# Patient Record
Sex: FEMALE | Race: WHITE | NOT HISPANIC OR LATINO | Employment: FULL TIME | URBAN - METROPOLITAN AREA
[De-identification: names, ages, dates, MRNs, and addresses within clinical notes are randomized per-mention and may not be internally consistent; named-entity substitution may affect disease eponyms.]

---

## 2023-06-05 ENCOUNTER — HOSPITAL ENCOUNTER (EMERGENCY)
Facility: HOSPITAL | Age: 53
Discharge: HOME | End: 2023-06-05
Attending: EMERGENCY MEDICINE | Admitting: EMERGENCY MEDICINE
Payer: OTHER MISCELLANEOUS

## 2023-06-05 ENCOUNTER — APPOINTMENT (OUTPATIENT)
Dept: GENERAL RADIOLOGY | Facility: HOSPITAL | Age: 53
End: 2023-06-05
Payer: OTHER MISCELLANEOUS

## 2023-06-05 VITALS
HEIGHT: 62 IN | TEMPERATURE: 98.6 F | DIASTOLIC BLOOD PRESSURE: 70 MMHG | WEIGHT: 167 LBS | OXYGEN SATURATION: 98 % | BODY MASS INDEX: 30.73 KG/M2 | HEART RATE: 80 BPM | SYSTOLIC BLOOD PRESSURE: 138 MMHG | RESPIRATION RATE: 16 BRPM

## 2023-06-05 DIAGNOSIS — M51.36 LUMBAR DEGENERATIVE DISC DISEASE: Primary | ICD-10-CM

## 2023-06-05 DIAGNOSIS — M51.34 THORACIC DEGENERATIVE DISC DISEASE: ICD-10-CM

## 2023-06-05 DIAGNOSIS — S39.012A BACK STRAIN, INITIAL ENCOUNTER: ICD-10-CM

## 2023-06-05 PROCEDURE — 99283 EMERGENCY DEPT VISIT LOW MDM: CPT

## 2023-06-05 PROCEDURE — 72100 X-RAY EXAM L-S SPINE 2/3 VWS: CPT

## 2023-06-05 PROCEDURE — 72072 X-RAY EXAM THORAC SPINE 3VWS: CPT

## 2023-06-05 RX ORDER — CYCLOBENZAPRINE HCL 10 MG
10 TABLET ORAL 3 TIMES DAILY PRN
Qty: 15 TABLET | Refills: 0 | Status: SHIPPED | OUTPATIENT
Start: 2023-06-05

## 2023-06-05 RX ORDER — ACETAMINOPHEN 500 MG
1000 TABLET ORAL ONCE
Status: COMPLETED | OUTPATIENT
Start: 2023-06-05 | End: 2023-06-05

## 2023-06-05 RX ORDER — LIDOCAINE 50 MG/G
1 PATCH TOPICAL EVERY 24 HOURS
Qty: 6 PATCH | Refills: 0 | Status: SHIPPED | OUTPATIENT
Start: 2023-06-05 | End: 2023-06-11

## 2023-06-05 RX ORDER — CYCLOBENZAPRINE HCL 10 MG
10 TABLET ORAL ONCE
Status: COMPLETED | OUTPATIENT
Start: 2023-06-05 | End: 2023-06-05

## 2023-06-05 RX ADMIN — CYCLOBENZAPRINE 10 MG: 10 TABLET, FILM COATED ORAL at 16:49

## 2023-06-05 RX ADMIN — ACETAMINOPHEN 1000 MG: 500 TABLET ORAL at 16:49

## 2023-06-05 NOTE — ED PROVIDER NOTES
Subjective   History of Present Illness  Pt is a 51 yo female presenting to ED with complaints of back pain. PMHx significant for HTN, HLD, Fatty liver and Depression. Pt was at work PTA and throwing a heavy bag into trash at work and had sudden onset of mid to lower back pain. She denies falling to the the ground. Pain worse with movement. She denies pain radiating into legs. She denies numbness or weakness to LE. She denies loss of bowel or bladder. She denies hx of significant back problems. She took no meds PTA. She is unable to take NSAIDs due to her liver. She denies other injuries. She uses tobacco and ETOH but denies drug use.     History provided by:  Patient and medical records    Review of Systems   Constitutional:  Negative for fever.   Eyes:  Negative for visual disturbance.   Respiratory:  Negative for cough and shortness of breath.    Cardiovascular:  Negative for chest pain.   Gastrointestinal:  Negative for abdominal pain, diarrhea, nausea and vomiting.   Genitourinary:  Negative for difficulty urinating and dysuria.   Musculoskeletal:  Positive for back pain. Negative for arthralgias and neck pain.   Skin:  Negative for color change and wound.   Neurological:  Negative for dizziness, weakness and numbness.     History reviewed. No pertinent past medical history.    No Known Allergies    No past surgical history on file.    History reviewed. No pertinent family history.    Social History     Socioeconomic History    Marital status: Other           Objective   Physical Exam  Vitals and nursing note reviewed.   Constitutional:       Appearance: She is well-developed.   HENT:      Head: Atraumatic.      Nose: Nose normal.   Eyes:      General: Lids are normal.      Conjunctiva/sclera: Conjunctivae normal.      Pupils: Pupils are equal, round, and reactive to light.   Cardiovascular:      Rate and Rhythm: Normal rate and regular rhythm.      Heart sounds: Normal heart sounds.   Pulmonary:      Effort:  Pulmonary effort is normal.      Breath sounds: Normal breath sounds. No wheezing.   Abdominal:      General: There is no distension.      Palpations: Abdomen is soft.      Tenderness: There is no abdominal tenderness. There is no guarding or rebound.   Musculoskeletal:         General: No tenderness. Normal range of motion.      Cervical back: Normal range of motion and neck supple. No tenderness. Normal range of motion.      Thoracic back: Tenderness present. No swelling, deformity or spasms. Normal range of motion.      Lumbar back: Tenderness present. No swelling, deformity or spasms. Normal range of motion. Negative right straight leg raise test and negative left straight leg raise test.   Skin:     General: Skin is warm and dry.      Findings: No erythema or rash.   Neurological:      Mental Status: She is alert and oriented to person, place, and time.      Sensory: No sensory deficit.   Psychiatric:         Speech: Speech normal.         Behavior: Behavior normal.       Procedures           ED Course      No results found for this or any previous visit (from the past 24 hour(s)).  Note: In addition to lab results from this visit, the labs listed above may include labs taken at another facility or during a different encounter within the last 24 hours. Please correlate lab times with ED admission and discharge times for further clarification of the services performed during this visit.    XR Spine Thoracic 3 View   Final Result   Impression:   Multiple views of the thoracic and lumbar spine were obtained. No acute fracture is identified. Bones are demineralized. There are degenerative changes within the thoracic and lumbar spine. There is 7 mm anterolisthesis at L4-L5. Vertebral body heights    appear maintained. Sacroiliac joints appear unremarkable. Visualized lungs are clear.      If there is clinical suspicion for occult fracture, further evaluation with CT may be considered.      Electronically Signed:  "Olegario Epperson     6/5/2023 5:09 PM EDT     Workstation ID: [id number]      XR Spine Lumbar 2 or 3 View   Final Result   Impression:   Multiple views of the thoracic and lumbar spine were obtained. No acute fracture is identified. Bones are demineralized. There are degenerative changes within the thoracic and lumbar spine. There is 7 mm anterolisthesis at L4-L5. Vertebral body heights    appear maintained. Sacroiliac joints appear unremarkable. Visualized lungs are clear.      If there is clinical suspicion for occult fracture, further evaluation with CT may be considered.      Electronically Signed: Olegario Epperson     6/5/2023 5:09 PM EDT     Workstation ID: [id number]        Vitals:    06/05/23 1515 06/05/23 1732   BP: 147/77 138/70   BP Location: Left arm Left arm   Patient Position: Sitting Sitting   Pulse: 94 80   Resp: 18 16   Temp: 98.7 °F (37.1 °C) 98.6 °F (37 °C)   TempSrc: Oral Oral   SpO2: 95% 98%   Weight: 75.8 kg (167 lb)    Height: 157.5 cm (62\")      Medications   cyclobenzaprine (FLEXERIL) tablet 10 mg (10 mg Oral Given 6/5/23 1649)   acetaminophen (TYLENOL) tablet 1,000 mg (1,000 mg Oral Given 6/5/23 1649)     ECG/EMG Results (last 24 hours)       ** No results found for the last 24 hours. **          No orders to display       DISCHARGE    Patient discharged in stable condition.    Reviewed implications of results, diagnosis, meds, responsibility to follow up, warning signs and symptoms of possible worsening, potential complications and reasons to return to ER.    Patient/Family voiced understanding of above instructions.    Discussed plan for discharge, as there is no emergent indication for admission.  Pt/family is agreeable and understands need for follow up and possible repeat testing.  Pt/family is aware that discharge does not mean that nothing is wrong but that it indicates no emergency is currently present that requires admission and they must continue care with follow-up as given below or with " a physician of their choice.     FOLLOW-UP  Lizzy Esparza, APRN  [address] [facility] [id number] [phone]    Schedule an appointment as soon as possible for a visit       [facility] OCCUPATIONAL MEDICINE  [address]  [facility] [address] [phone]        [facility] Emergency Department  [address]  [facility] [address] [phone]    If symptoms worsen         Medication List        New Prescriptions      cyclobenzaprine 10 MG tablet  Commonly known as: FLEXERIL  Take 1 tablet by mouth 3 (Three) Times a Day As Needed for Muscle Spasms for up to 15 doses.     lidocaine 5 %  Commonly known as: LIDODERM  Place 1 patch on the skin as directed by provider Daily for 6 doses. Remove & Discard patch within 12 hours or as directed by MD               Where to Get Your Medications        These medications were sent to [id number] [place], [place]  [address] [place] [address] [facility] [address] [facility] [address] - [phone]  - [phone] [address] [facility] [address]      Phone: [phone]   cyclobenzaprine 10 MG tablet  lidocaine 5 %                                            Medical Decision Making  Pt is a 51 yo female presenting to ED with complaints of back pain after injury at work. Xrays in ED without emergent findings. Noted degenerative findings. Will dc home with Flexeril and Lidoderm patches. She will f/u with PCP and Workers Comp. Went over new / worse sx to return to ED.     DDx  Fracture, Sprain, Strain, DDD, Contusion     Amount and/or Complexity of Data Reviewed  Independent Historian: friend  Radiology: ordered. Decision-making details documented in ED Course.    Risk  OTC drugs.  Prescription drug management.        Final diagnoses:   Lumbar degenerative disc disease   Thoracic degenerative disc disease   Back strain, initial encounter       ED Disposition  ED  Disposition       ED Disposition   Discharge    Condition   Stable    Comment   --               Lizzy Esparza, APRN  [address] [facility] [id number] [phone]    Schedule an appointment as soon as possible for a visit       [facility] OCCUPATIONAL MEDICINE  [address]  [facility] [address] [phone]        [facility] Emergency Department  [address] [facility] [address] [phone]    If symptoms worsen         Medication List        New Prescriptions      cyclobenzaprine 10 MG tablet  Commonly known as: FLEXERIL  Take 1 tablet by mouth 3 (Three) Times a Day As Needed for Muscle Spasms for up to 15 doses.     lidocaine 5 %  Commonly known as: LIDODERM  Place 1 patch on the skin as directed by provider Daily for 6 doses. Remove & Discard patch within 12 hours or as directed by MD               Where to Get Your Medications        These medications were sent to [id number] - [place], [place] - [address] [facility] [address] - [phone] [facility] [phone] [address] [employer] [facility] [address]      Phone: [phone]   cyclobenzaprine 10 MG tablet  lidocaine 5 %            Sandhya Morrow PA  06/05/23 5007